# Patient Record
Sex: FEMALE | URBAN - METROPOLITAN AREA
[De-identification: names, ages, dates, MRNs, and addresses within clinical notes are randomized per-mention and may not be internally consistent; named-entity substitution may affect disease eponyms.]

---

## 2019-04-18 ENCOUNTER — APPOINTMENT (OUTPATIENT)
Dept: URBAN - METROPOLITAN AREA CLINIC 319 | Age: 84
Setting detail: DERMATOLOGY
End: 2019-04-18

## 2019-04-18 DIAGNOSIS — L20.89 OTHER ATOPIC DERMATITIS: ICD-10-CM

## 2019-04-18 PROBLEM — L20.84 INTRINSIC (ALLERGIC) ECZEMA: Status: ACTIVE | Noted: 2019-04-18

## 2019-04-18 PROCEDURE — OTHER TREATMENT REGIMEN: OTHER

## 2019-04-18 PROCEDURE — OTHER INJECTION: OTHER

## 2019-04-18 PROCEDURE — 96372 THER/PROPH/DIAG INJ SC/IM: CPT

## 2019-04-18 PROCEDURE — OTHER COUNSELING: OTHER

## 2019-04-18 PROCEDURE — OTHER PRESCRIPTION: OTHER

## 2019-04-18 RX ORDER — SALICYLIC ACID 3 G/100G
LOTION TOPICAL
Qty: 30 | Refills: 3 | Status: ERX | COMMUNITY
Start: 2019-04-18

## 2019-04-18 RX ORDER — CETIRIZINE HCL/PSEUDOEPHEDRINE 5 MG-120MG
TABLET, EXTENDED RELEASE 12 HR ORAL
Qty: 30 | Refills: 3 | Status: ERX | COMMUNITY
Start: 2019-04-18

## 2019-04-18 ASSESSMENT — LOCATION ZONE DERM: LOCATION ZONE: TRUNK

## 2019-04-18 ASSESSMENT — LOCATION DETAILED DESCRIPTION DERM
LOCATION DETAILED: RIGHT INFERIOR MEDIAL UPPER BACK
LOCATION DETAILED: LEFT BUTTOCK

## 2019-04-18 ASSESSMENT — LOCATION SIMPLE DESCRIPTION DERM
LOCATION SIMPLE: LEFT BUTTOCK
LOCATION SIMPLE: RIGHT UPPER BACK

## 2019-04-18 NOTE — PROCEDURE: TREATMENT REGIMEN
Detail Level: Detailed
Continue Regimen: Triamcinolone cream twice a day to back prescribed by PCP
Otc Regimen: Apply CeraVe itch relief cream after applying Triamcinolone cream\\nOnce improved may switch to CeraVe moisturizing cream

## 2019-05-21 ENCOUNTER — APPOINTMENT (OUTPATIENT)
Dept: URBAN - METROPOLITAN AREA CLINIC 319 | Age: 84
Setting detail: DERMATOLOGY
End: 2019-05-21

## 2019-05-21 DIAGNOSIS — L20.89 OTHER ATOPIC DERMATITIS: ICD-10-CM

## 2019-05-21 DIAGNOSIS — L85.3 XEROSIS CUTIS: ICD-10-CM

## 2019-05-21 DIAGNOSIS — L82.1 OTHER SEBORRHEIC KERATOSIS: ICD-10-CM

## 2019-05-21 PROCEDURE — OTHER TREATMENT REGIMEN: OTHER

## 2019-05-21 PROCEDURE — OTHER PRESCRIPTION: OTHER

## 2019-05-21 PROCEDURE — 99213 OFFICE O/P EST LOW 20 MIN: CPT | Mod: 25

## 2019-05-21 PROCEDURE — OTHER COUNSELING: OTHER

## 2019-05-21 PROCEDURE — 96372 THER/PROPH/DIAG INJ SC/IM: CPT

## 2019-05-21 PROCEDURE — OTHER INJECTION: OTHER

## 2019-05-21 RX ORDER — HYDROCORTISONE 25 MG/G
OINTMENT TOPICAL
Qty: 1 | Refills: 3 | Status: ERX | COMMUNITY
Start: 2019-05-21

## 2019-05-21 RX ORDER — SALICYLIC ACID 3 G/100G
CREAM TOPICAL
Qty: 60 | Refills: 4 | Status: ERX | COMMUNITY
Start: 2019-05-21

## 2019-05-21 ASSESSMENT — LOCATION DETAILED DESCRIPTION DERM
LOCATION DETAILED: NASAL DORSUM
LOCATION DETAILED: RIGHT PROXIMAL RADIAL DORSAL FOREARM
LOCATION DETAILED: LEFT PROXIMAL DORSAL FOREARM
LOCATION DETAILED: LEFT PROXIMAL PRETIBIAL REGION
LOCATION DETAILED: RIGHT MID-UPPER BACK
LOCATION DETAILED: RIGHT INFERIOR VERMILION LIP
LOCATION DETAILED: LEFT BUTTOCK
LOCATION DETAILED: RIGHT PROXIMAL PRETIBIAL REGION

## 2019-05-21 ASSESSMENT — LOCATION SIMPLE DESCRIPTION DERM
LOCATION SIMPLE: NOSE
LOCATION SIMPLE: RIGHT UPPER BACK
LOCATION SIMPLE: LEFT BUTTOCK
LOCATION SIMPLE: RIGHT FOREARM
LOCATION SIMPLE: RIGHT PRETIBIAL REGION
LOCATION SIMPLE: RIGHT LIP
LOCATION SIMPLE: LEFT PRETIBIAL REGION
LOCATION SIMPLE: LEFT FOREARM

## 2019-05-21 ASSESSMENT — LOCATION ZONE DERM
LOCATION ZONE: LEG
LOCATION ZONE: LIP
LOCATION ZONE: ARM
LOCATION ZONE: TRUNK
LOCATION ZONE: NOSE

## 2019-05-21 NOTE — PROCEDURE: TREATMENT REGIMEN
Detail Level: Detailed
Otc Regimen: Apply CeraVe itch relief cream after applying Triamcinolone cream\\nOnce clear apply CeraVe moisturizing cream twice Eileen a day throughout body to moisturize
Otc Regimen: Apply aquaphor in between times
Continue Regimen: Triamcinolone cream twice a day to back prescribed by PCP.
Otc Regimen: Apply mineral oil to help soften
Discontinue Regimen: Allegra Allergy 180 mg tablet (Take 1 tablet by mouth daily) \\nZyrtec 10 mg tablet (Take 1 tablet by mouth at bedtime)
Plan: If patient needs a refill on triamcinolone cream she would like the big tube
